# Patient Record
Sex: FEMALE | Race: WHITE | Employment: OTHER | URBAN - METROPOLITAN AREA
[De-identification: names, ages, dates, MRNs, and addresses within clinical notes are randomized per-mention and may not be internally consistent; named-entity substitution may affect disease eponyms.]

---

## 2021-11-30 PROBLEM — Z86.010 PERSONAL HISTORY OF COLONIC POLYPS: Status: ACTIVE | Noted: 2021-11-30

## 2021-11-30 PROBLEM — I10 HTN (HYPERTENSION): Status: ACTIVE | Noted: 2021-11-30

## 2021-11-30 PROBLEM — E07.9 THYROID DISORDER: Status: ACTIVE | Noted: 2021-11-30

## 2021-11-30 RX ORDER — TRAMADOL HYDROCHLORIDE 50 MG/1
50 TABLET ORAL
COMMUNITY
Start: 2021-09-29

## 2021-11-30 RX ORDER — ZOLPIDEM TARTRATE 10 MG/1
10 TABLET ORAL
COMMUNITY
Start: 2021-11-12

## 2021-11-30 RX ORDER — VERAPAMIL HYDROCHLORIDE 240 MG/1
240 TABLET, FILM COATED, EXTENDED RELEASE ORAL DAILY
COMMUNITY
Start: 2021-10-06

## 2021-11-30 RX ORDER — LEVOTHYROXINE SODIUM 75 UG/1
75 TABLET ORAL DAILY
COMMUNITY
Start: 2021-11-11

## 2021-12-01 ENCOUNTER — OFFICE VISIT (OUTPATIENT)
Dept: GASTROENTEROLOGY | Age: 81
End: 2021-12-01
Payer: MEDICARE

## 2021-12-01 VITALS
HEIGHT: 57 IN | DIASTOLIC BLOOD PRESSURE: 72 MMHG | OXYGEN SATURATION: 95 % | HEART RATE: 79 BPM | BODY MASS INDEX: 23.86 KG/M2 | TEMPERATURE: 97.7 F | RESPIRATION RATE: 18 BRPM | WEIGHT: 110.6 LBS | SYSTOLIC BLOOD PRESSURE: 116 MMHG

## 2021-12-01 DIAGNOSIS — K50.119 CROHN'S DISEASE OF LARGE INTESTINE WITH COMPLICATION (HCC): Primary | ICD-10-CM

## 2021-12-01 DIAGNOSIS — N18.4 STAGE 4 CHRONIC KIDNEY DISEASE (HCC): ICD-10-CM

## 2021-12-01 DIAGNOSIS — Z86.010 PERSONAL HISTORY OF COLONIC POLYPS: ICD-10-CM

## 2021-12-01 DIAGNOSIS — K21.00 GASTROESOPHAGEAL REFLUX DISEASE WITH ESOPHAGITIS WITHOUT HEMORRHAGE: ICD-10-CM

## 2021-12-01 PROCEDURE — G8427 DOCREV CUR MEDS BY ELIG CLIN: HCPCS | Performed by: INTERNAL MEDICINE

## 2021-12-01 PROCEDURE — 1090F PRES/ABSN URINE INCON ASSESS: CPT | Performed by: INTERNAL MEDICINE

## 2021-12-01 PROCEDURE — G8510 SCR DEP NEG, NO PLAN REQD: HCPCS | Performed by: INTERNAL MEDICINE

## 2021-12-01 PROCEDURE — 99204 OFFICE O/P NEW MOD 45 MIN: CPT | Performed by: INTERNAL MEDICINE

## 2021-12-01 PROCEDURE — 1101F PT FALLS ASSESS-DOCD LE1/YR: CPT | Performed by: INTERNAL MEDICINE

## 2021-12-01 PROCEDURE — G8536 NO DOC ELDER MAL SCRN: HCPCS | Performed by: INTERNAL MEDICINE

## 2021-12-01 PROCEDURE — G8400 PT W/DXA NO RESULTS DOC: HCPCS | Performed by: INTERNAL MEDICINE

## 2021-12-01 PROCEDURE — G8420 CALC BMI NORM PARAMETERS: HCPCS | Performed by: INTERNAL MEDICINE

## 2021-12-01 RX ORDER — DULOXETIN HYDROCHLORIDE 30 MG/1
1 CAPSULE, DELAYED RELEASE ORAL DAILY
COMMUNITY
Start: 2021-04-29

## 2021-12-01 RX ORDER — MINERAL OIL
1 ENEMA (ML) RECTAL DAILY
COMMUNITY

## 2021-12-01 RX ORDER — VEDOLIZUMAB 300 MG/5ML
300 INJECTION, POWDER, LYOPHILIZED, FOR SOLUTION INTRAVENOUS
COMMUNITY

## 2021-12-01 RX ORDER — FLUTICASONE PROPIONATE 50 MCG
2 SPRAY, SUSPENSION (ML) NASAL DAILY
COMMUNITY

## 2021-12-01 NOTE — PROGRESS NOTES
Chief Complaint   Patient presents with    New Order     new patient, hx of colon polyps, Crohns, previous Dr Ward Mac patient. 1. Have you been to the ER, urgent care clinic since your last visit? Hospitalized since your last visit? No    2. Have you seen or consulted any other health care providers outside of the [address] [place] [address] since your last visit? Include any pap smears or colon screening.  No   Visit Vitals  /72 (BP 1 Location: Right arm, BP Patient Position: Sitting, BP Cuff Size: Adult)   Pulse 79   Temp 97.7 °F (36.5 °C) (Temporal)   Resp 18   Ht 4' 9\" (1.448 m)   Wt 110 lb 9.6 oz (50.2 kg)   SpO2 95%   BMI 23.93 kg/m²

## 2021-12-01 NOTE — PROGRESS NOTES
Lawayne Felty is a [de-identified] y.o. female who presents today for the following:  Chief Complaint   Patient presents with    New Order     new patient, hx of colon polyps, Crohns, previous Dr Kurtis Stephens patient. No Known Allergies    Current Outpatient Medications   Medication Sig    DULoxetine (CYMBALTA) 30 mg capsule 1 Capsule daily.  fexofenadine (ALLEGRA) 180 mg tablet Take 1 Tablet by mouth daily.  levothyroxine (SYNTHROID) 75 mcg tablet     traMADoL (ULTRAM) 50 mg tablet     verapamil ER (CALAN-SR) 240 mg CR tablet     zolpidem (AMBIEN) 10 mg tablet     vedolizumab (Entyvio) 300 mg injection 300 mg by IntraVENous route every six (6) weeks.  fluticasone propionate (FLONASE) 50 mcg/actuation nasal spray 2 Sprays by Both Nostrils route daily.  calcium-cholecalciferol, d3, 600 mg calcium- 200 unit cap Take 1 Capsule by mouth daily. No current facility-administered medications for this visit. Past Medical History:   Diagnosis Date    Colon polyps 03/07/2019    rectal polyp    Constipation     Crohn disease ([address]     Early satiety     GERD (gastroesophageal reflux disease)     HTN (hypertension) 11/30/2021    Thyroid disorder 11/30/2021       Past Surgical History:   Procedure Laterality Date    COLONOSCOPY  03/07/2019    rectal polyp  Dr Aram Shelton VISIT-OUTPATIENT  03/07/2017    DR Joanna Leiva  - EARLY SATIETY, WT LOSS , BACK PAIN    HX APPENDECTOMY      HX HYSTERECTOMY         No family history on file.     Social History     Socioeconomic History    Marital status:      Spouse name: Not on file    Number of children: Not on file    Years of education: Not on file    Highest education level: Not on file   Occupational History    Not on file   Tobacco Use    Smoking status: Not on file    Smokeless tobacco: Not on file   Substance and Sexual Activity    Alcohol use: Not on file    Drug use: Not on file    Sexual activity: Not on file   Other Topics Concern    Not on file   Social History Narrative    Not on file     Social Determinants of Health     Financial Resource Strain:     Difficulty of Paying Living Expenses: Not on file   Food Insecurity:     Worried About Running Out of Food in the Last Year: Not on file    Ayush of Food in the Last Year: Not on file   Transportation Needs:     Lack of Transportation (Medical): Not on file    Lack of Transportation (Non-Medical): Not on file   Physical Activity:     Days of Exercise per Week: Not on file    Minutes of Exercise per Session: Not on file   Stress:     Feeling of Stress : Not on file   Social Connections:     Frequency of Communication with Friends and Family: Not on file    Frequency of Social Gatherings with Friends and Family: Not on file    Attends Scientology Services: Not on file    Active Member of [address] [place] [address] [employer] or Organizations: Not on file    Attends Club or Organization Meetings: Not on file    Marital Status: Not on file   Intimate Partner Violence:     Fear of Current or Ex-Partner: Not on file    Emotionally Abused: Not on file    Physically Abused: Not on file    Sexually Abused: Not on file   Housing Stability:     Unable to Pay for Housing in the Last Year: Not on file    Number of [place] in the Last Year: Not on file    Unstable Housing in the Last Year: Not on file         HPI  49-year-old female with history of hypertension, chronic kidney disease stage IV, Crohn's disease, and GERD who comes in for evaluation of Crohn's disease. The patient is a former patient of Dr. Rosa Henriquez who left the area. Wishes to find a new gastroenterologist to assist in the management of her Crohn's disease. Patient states she was diagnosed with Crohn's disease at age 58. She has been on numerous medications including Asacol, Remicade, Imuran, and presently on Entyvio. Patient states she is unsure whether the [facility] Fleming is actually helping.   She states her main complications of the Crohn's disease occurs in her rectum which she has had chills and abscess in the past.  She still has discomfort in the rectum. She has a lot of constipation. No recent rectal bleeding. She does have a history of hemorrhoids. She states prior to her diagnosis of Crohn's disease she weighed approximately 180 pounds and is down to her present weight of proximal 111 pounds but her weight has been stable over the last few years. Her appetite is poor. She takes omeprazole as needed for GERD. Patient states she has a lot of urgency on defecation. She also has chronic kidney disease stage IV which was diagnosed approximately 3 years ago and sees a nephrologist on a regular basis. She states that presently she is receiving her biologic therapy given by her PCP since her GI doctor left the area. Her PCP wants her to find a new GI doctor to manage her Crohn's disease. Review of Systems   Constitutional: Negative. HENT: Negative. Negative for nosebleeds. Eyes: Negative. Respiratory: Negative. Cardiovascular: Negative. Gastrointestinal: Positive for abdominal pain, constipation, diarrhea and heartburn. Negative for blood in stool, melena, nausea and vomiting. Genitourinary: Negative. Musculoskeletal: Positive for back pain and joint pain. Skin: Negative. Neurological: Negative. Endo/Heme/Allergies: Negative. Psychiatric/Behavioral: Negative. All other systems reviewed and are negative. Visit Vitals  /72 (BP 1 Location: Right arm, BP Patient Position: Sitting, BP Cuff Size: Adult)   Pulse 79   Temp 97.7 °F (36.5 °C) (Temporal)   Resp 18   Ht 4' 9\" (1.448 m)   Wt 50.2 kg (110 lb 9.6 oz)   SpO2 95%   BMI 23.93 kg/m²     Physical Exam  Vitals and nursing note reviewed. Constitutional:       General: She is not in acute distress. Appearance: Normal appearance. She is normal weight. She is not ill-appearing. HENT:      Head: Normocephalic and atraumatic.       Nose: Nose normal. Mouth/Throat:      Mouth: Mucous membranes are moist.      Pharynx: Oropharynx is clear. Eyes:      General: No scleral icterus. Conjunctiva/sclera: Conjunctivae normal.      Pupils: Pupils are equal, round, and reactive to light. Cardiovascular:      Rate and Rhythm: Normal rate and regular rhythm. Pulses: Normal pulses. Heart sounds: Normal heart sounds. Pulmonary:      Effort: Pulmonary effort is normal.      Breath sounds: Normal breath sounds. Abdominal:      General: Bowel sounds are normal. There is no distension. Palpations: Abdomen is soft. There is no mass. Tenderness: There is abdominal tenderness. There is no right CVA tenderness, left CVA tenderness, guarding or rebound. Hernia: No hernia is present. Musculoskeletal:         General: Normal range of motion. Cervical back: Normal range of motion and neck supple. Skin:     General: Skin is warm and dry. Coloration: Skin is not jaundiced. Neurological:      General: No focal deficit present. Mental Status: She is alert and oriented to person, place, and time. Psychiatric:         Mood and Affect: Mood normal.         Behavior: Behavior normal.         Thought Content: Thought content normal.         Judgment: Judgment normal.            1. Crohn's disease of large intestine with complication ([facility]  We will continue the [facility] for now. If at any point she wishes to stop use or no improvement is noted we will give her a trial of a 5-ASA which she states did help her in the past.  Will try to obtain information from her PCP about present therapy she is receiving. 2. Personal history of colonic polyps  We will plan on a repeat colonoscopy in 5 years from last which would be 2024    3. Stage 4 chronic kidney disease ([facility]  Followed by her nephrologist    4.  Gastroesophageal reflux disease with esophagitis without hemorrhage  Continue as needed use of omeprazole

## 2022-03-14 ENCOUNTER — OFFICE VISIT (OUTPATIENT)
Dept: GASTROENTEROLOGY | Age: 82
End: 2022-03-14
Payer: MEDICARE

## 2022-03-14 VITALS
SYSTOLIC BLOOD PRESSURE: 110 MMHG | WEIGHT: 109.4 LBS | HEIGHT: 57 IN | DIASTOLIC BLOOD PRESSURE: 60 MMHG | HEART RATE: 89 BPM | RESPIRATION RATE: 12 BRPM | TEMPERATURE: 98 F | BODY MASS INDEX: 23.6 KG/M2 | OXYGEN SATURATION: 96 %

## 2022-03-14 DIAGNOSIS — Z86.010 PERSONAL HISTORY OF COLONIC POLYPS: ICD-10-CM

## 2022-03-14 DIAGNOSIS — K50.119 CROHN'S DISEASE OF LARGE INTESTINE WITH COMPLICATION (HCC): Primary | ICD-10-CM

## 2022-03-14 DIAGNOSIS — R13.19 ESOPHAGEAL DYSPHAGIA: ICD-10-CM

## 2022-03-14 DIAGNOSIS — K21.9 GASTROESOPHAGEAL REFLUX DISEASE, UNSPECIFIED WHETHER ESOPHAGITIS PRESENT: ICD-10-CM

## 2022-03-14 DIAGNOSIS — N18.4 STAGE 4 CHRONIC KIDNEY DISEASE (HCC): ICD-10-CM

## 2022-03-14 PROCEDURE — G8427 DOCREV CUR MEDS BY ELIG CLIN: HCPCS | Performed by: INTERNAL MEDICINE

## 2022-03-14 PROCEDURE — 99214 OFFICE O/P EST MOD 30 MIN: CPT | Performed by: INTERNAL MEDICINE

## 2022-03-14 PROCEDURE — G8536 NO DOC ELDER MAL SCRN: HCPCS | Performed by: INTERNAL MEDICINE

## 2022-03-14 PROCEDURE — G8400 PT W/DXA NO RESULTS DOC: HCPCS | Performed by: INTERNAL MEDICINE

## 2022-03-14 PROCEDURE — G8510 SCR DEP NEG, NO PLAN REQD: HCPCS | Performed by: INTERNAL MEDICINE

## 2022-03-14 PROCEDURE — 1101F PT FALLS ASSESS-DOCD LE1/YR: CPT | Performed by: INTERNAL MEDICINE

## 2022-03-14 PROCEDURE — 1090F PRES/ABSN URINE INCON ASSESS: CPT | Performed by: INTERNAL MEDICINE

## 2022-03-14 PROCEDURE — G8420 CALC BMI NORM PARAMETERS: HCPCS | Performed by: INTERNAL MEDICINE

## 2022-03-14 PROCEDURE — G8754 DIAS BP LESS 90: HCPCS | Performed by: INTERNAL MEDICINE

## 2022-03-14 PROCEDURE — G8752 SYS BP LESS 140: HCPCS | Performed by: INTERNAL MEDICINE

## 2022-03-14 RX ORDER — POLYETHYLENE GLYCOL 3350 17 G/17G
POWDER, FOR SOLUTION ORAL
Qty: 510 G | Refills: 0 | Status: SHIPPED | OUTPATIENT
Start: 2022-03-14 | End: 2022-03-23

## 2022-03-14 NOTE — PROGRESS NOTES
EGD AND COLONOSCOPY ARE SCHEDULED FOR 3- AT 9:30 AM.  COVID TEST IS SCHEDULED FOR 3-. NO PA REQUIRED - MEDICARE.

## 2022-03-14 NOTE — PROGRESS NOTES
1. Have you been to the ER, urgent care clinic since your last visit? Hospitalized since your last visit? NO    2. Have you seen or consulted any other health care providers outside of the [address] [place] [address] since your last visit? Include any pap smears or colon screening.  NO   Chief Complaint   Patient presents with    Follow-up     2 month follow up    Inflammatory Bowel Disease     CROHN'S     Visit Vitals  /60 (BP 1 Location: Left upper arm, BP Patient Position: Sitting, BP Cuff Size: Adult)   Pulse 89   Temp 98 °F (36.7 °C) (Temporal)   Resp 12   Ht 4' 9\" (1.448 m)   Wt 49.6 kg (109 lb 6.4 oz)   SpO2 96% Comment: ROOM AIR   BMI 23.67 kg/m²

## 2022-03-15 NOTE — PROGRESS NOTES
Yesenia Cage is a 80 y.o. female who presents today for the following:  Chief Complaint   Patient presents with    Follow-up     2 month follow up    Inflammatory Bowel Disease     CROHN'S   ABOUT THE SAME,  NAUSEA DIARRHEA, CONSTIPATION         No Known Allergies    Current Outpatient Medications   Medication Sig    polyethylene glycol (MIRALAX) 17 gram/dose powder Use as directed  Indications: emptying of the bowel    DULoxetine (CYMBALTA) 30 mg capsule 1 Capsule daily.  vedolizumab (Entyvio) 300 mg injection 300 mg by IntraVENous route every six (6) weeks.  calcium-cholecalciferol, d3, 600 mg calcium- 200 unit cap Take 1 Capsule by mouth daily.  levothyroxine (SYNTHROID) 75 mcg tablet Take 75 mcg by mouth daily.  traMADoL (ULTRAM) 50 mg tablet 50 mg every eight (8) hours as needed.  verapamil ER (CALAN-SR) 240 mg CR tablet     zolpidem (AMBIEN) 10 mg tablet Take 10 mg by mouth nightly as needed.  fexofenadine (ALLEGRA) 180 mg tablet Take 1 Tablet by mouth daily.  fluticasone propionate (FLONASE) 50 mcg/actuation nasal spray 2 Sprays by Both Nostrils route daily. No current facility-administered medications for this visit.        Past Medical History:   Diagnosis Date    Colon polyps 03/07/2019    rectal polyp    Constipation     Crohn disease ([address]     Early satiety     GERD (gastroesophageal reflux disease)     HTN (hypertension) 11/30/2021    Thyroid disorder 11/30/2021       Past Surgical History:   Procedure Laterality Date    COLONOSCOPY  03/07/2019    rectal polyp  Dr Richard Bernal ENDOSCOPY VISIT-OUTPATIENT  03/07/2017    DR Carly Lowry  - EARLY SATIETY, WT LOSS , BACK PAIN    HX APPENDECTOMY      HX HYSTERECTOMY         Family History   Problem Relation Age of Onset    Pancreatic Cancer Mother     Diabetes Mother     Cancer Mother     Heart Disease Father     Hypertension Sister     Diabetes Sister     Multiple myeloma Sister     Glaucoma Sister        Social History Socioeconomic History    Marital status:      Spouse name: Not on file    Number of children: Not on file    Years of education: Not on file    Highest education level: Not on file   Occupational History    Not on file   Tobacco Use    Smoking status: Never Smoker    Smokeless tobacco: Never Used   Vaping Use    Vaping Use: Never used   Substance and Sexual Activity    Alcohol use: Never    Drug use: Never    Sexual activity: Not on file   Other Topics Concern    Not on file   Social History Narrative    Not on file     Social Determinants of Health     Financial Resource Strain:     Difficulty of Paying Living Expenses: Not on file   Food Insecurity:     Worried About [address] [employer] in the Last Year: Not on file    Ayuhs of Food in the Last Year: Not on file   Transportation Needs:     Lack of Transportation (Medical): Not on file    Lack of Transportation (Non-Medical):  Not on file   Physical Activity:     Days of Exercise per Week: Not on file    Minutes of Exercise per Session: Not on file   Stress:     Feeling of Stress : Not on file   Social Connections:     Frequency of Communication with Friends and Family: Not on file    Frequency of Social Gatherings with Friends and Family: Not on file    Attends Druze Services: Not on file    Active Member of [address] [place] [address] [employer] or Organizations: Not on file    Attends Club or Organization Meetings: Not on file    Marital Status: Not on file   Intimate Partner Violence:     Fear of Current or Ex-Partner: Not on file    Emotionally Abused: Not on file    Physically Abused: Not on file    Sexually Abused: Not on file   Housing Stability:     Unable to Pay for Housing in the Last Year: Not on file    Number of [place] in the Last Year: Not on file    Unstable Housing in the Last Year: Not on file         HPI  80year-old with history of hypertension, chronic kidney disease stage IV, Crohn's disease on a biologic agent, and gastroesophageal reflux disease who comes in for follow-up visit for Crohn's disease. Patient states she has good and bad days. She states she has constipation she also has incontinence on laying down at night. She states her main problem is in her rectum which is the location of her Crohn's disease primarily. She has burning in the lower abdomen in the mornings which improves as the day goes on. Her appetite is only fair. She states her taste is not as good. She has been maintaining her weight. She states that her treatment with Entyvio causes ulcers in her mouth and soreness in that area for the week after completion of her treatment she receives treatment every 6 to 8 weeks. Her next treatment is due in April 2022. He states that her chronic kidney disease stage IV has been stable however when she found that she had kidney disease it was already at stage IV. Patient last had a colonoscopy in 2017 by Dr. Darleen Siemens and it was at this time that she was started on the [facility] afterwards. She had been on Remicade prior to that time and it was changed secondary to no improvement which was noted with that medication. Patient has been having problems with her swallowing. It was last examined in 2019 at the time of her colonoscopy. Review of Systems   Constitutional: Negative. HENT: Negative for nosebleeds. Eyes: Negative. Respiratory: Negative. Cardiovascular: Negative. Gastrointestinal: Positive for abdominal pain, constipation and diarrhea. Negative for blood in stool, heartburn, melena, nausea and vomiting. Genitourinary: Negative. Musculoskeletal: Positive for back pain and joint pain. Skin: Negative. Neurological: Negative. Endo/Heme/Allergies: Negative. Psychiatric/Behavioral: Negative. All other systems reviewed and are negative.         Visit Vitals  /60 (BP 1 Location: Left upper arm, BP Patient Position: Sitting, BP Cuff Size: Adult)   Pulse 89   Temp 98 °F (36.7 °C) (Temporal)   Resp 12   Ht 4' 9\" (1.448 m)   Wt 49.6 kg (109 lb 6.4 oz)   SpO2 96% Comment: ROOM AIR   BMI 23.67 kg/m²     Physical Exam  Vitals and nursing note reviewed. Constitutional:       Appearance: Normal appearance. She is normal weight. HENT:      Head: Normocephalic and atraumatic. Nose: Nose normal.      Mouth/Throat:      Mouth: Mucous membranes are moist.      Pharynx: Oropharynx is clear. Eyes:      General: No scleral icterus. Conjunctiva/sclera: Conjunctivae normal.      Pupils: Pupils are equal, round, and reactive to light. Cardiovascular:      Rate and Rhythm: Normal rate and regular rhythm. Pulses: Normal pulses. Heart sounds: Normal heart sounds. Pulmonary:      Effort: Pulmonary effort is normal.      Breath sounds: Normal breath sounds. Abdominal:      General: Bowel sounds are normal. There is no distension. Palpations: Abdomen is soft. There is no mass. Tenderness: There is no abdominal tenderness. There is no right CVA tenderness, left CVA tenderness, guarding or rebound. Hernia: No hernia is present. Musculoskeletal:         General: Normal range of motion. Cervical back: Normal range of motion and neck supple. Skin:     General: Skin is warm and dry. Coloration: Skin is not jaundiced. Neurological:      General: No focal deficit present. Mental Status: She is alert and oriented to person, place, and time. Psychiatric:         Mood and Affect: Mood normal.         Behavior: Behavior normal.         Thought Content:  Thought content normal.         Judgment: Judgment normal.            1. Crohn's disease of large intestine with complication ([address]  We will need to reevaluate the effectiveness of her biologic agent since her colon has not been reexamined since her last colonoscopy in 2019.  - COLONOSCOPY,DIAGNOSTIC; Future  - polyethylene glycol (MIRALAX) 17 gram/dose powder; Use as directed  Indications: emptying of the bowel  Dispense: 510 g; Refill: 0    2. Personal history of colonic polyps    - COLONOSCOPY,DIAGNOSTIC; Future  - polyethylene glycol (MIRALAX) 17 gram/dose powder; Use as directed  Indications: emptying of the bowel  Dispense: 510 g; Refill: 0    3. Esophageal dysphagia  We will schedule patient for an EGD to evaluate the dysphagia which patient is having with plans for possible form esophageal dilatation  - UPPER GI ENDOSCOPY,DIAGNOSIS; Future    4. Gastroesophageal reflux disease, unspecified whether esophagitis present      5.  Stage 4 chronic kidney disease ([facility]

## 2022-03-15 NOTE — H&P (VIEW-ONLY)
Yesenia Cage is a 80 y.o. female who presents today for the following:  Chief Complaint   Patient presents with    Follow-up     2 month follow up    Inflammatory Bowel Disease     CROHN'S   ABOUT THE SAME,  NAUSEA DIARRHEA, CONSTIPATION         No Known Allergies    Current Outpatient Medications   Medication Sig    polyethylene glycol (MIRALAX) 17 gram/dose powder Use as directed  Indications: emptying of the bowel    DULoxetine (CYMBALTA) 30 mg capsule 1 Capsule daily.  vedolizumab (Entyvio) 300 mg injection 300 mg by IntraVENous route every six (6) weeks.  calcium-cholecalciferol, d3, 600 mg calcium- 200 unit cap Take 1 Capsule by mouth daily.  levothyroxine (SYNTHROID) 75 mcg tablet Take 75 mcg by mouth daily.  traMADoL (ULTRAM) 50 mg tablet 50 mg every eight (8) hours as needed.  verapamil ER (CALAN-SR) 240 mg CR tablet     zolpidem (AMBIEN) 10 mg tablet Take 10 mg by mouth nightly as needed.  fexofenadine (ALLEGRA) 180 mg tablet Take 1 Tablet by mouth daily.  fluticasone propionate (FLONASE) 50 mcg/actuation nasal spray 2 Sprays by Both Nostrils route daily. No current facility-administered medications for this visit.        Past Medical History:   Diagnosis Date    Colon polyps 03/07/2019    rectal polyp    Constipation     Crohn disease ([address]     Early satiety     GERD (gastroesophageal reflux disease)     HTN (hypertension) 11/30/2021    Thyroid disorder 11/30/2021       Past Surgical History:   Procedure Laterality Date    COLONOSCOPY  03/07/2019    rectal polyp  Dr Richard Bernal ENDOSCOPY VISIT-OUTPATIENT  03/07/2017    DR Carly Lowry  - EARLY SATIETY, WT LOSS , BACK PAIN    HX APPENDECTOMY      HX HYSTERECTOMY         Family History   Problem Relation Age of Onset    Pancreatic Cancer Mother     Diabetes Mother     Cancer Mother     Heart Disease Father     Hypertension Sister     Diabetes Sister     Multiple myeloma Sister     Glaucoma Sister        Social History Socioeconomic History    Marital status:      Spouse name: Not on file    Number of children: Not on file    Years of education: Not on file    Highest education level: Not on file   Occupational History    Not on file   Tobacco Use    Smoking status: Never Smoker    Smokeless tobacco: Never Used   Vaping Use    Vaping Use: Never used   Substance and Sexual Activity    Alcohol use: Never    Drug use: Never    Sexual activity: Not on file   Other Topics Concern    Not on file   Social History Narrative    Not on file     Social Determinants of Health     Financial Resource Strain:     Difficulty of Paying Living Expenses: Not on file   Food Insecurity:     Worried About [employer] in the Last Year: Not on file    Ayush of Food in the Last Year: Not on file   Transportation Needs:     Lack of Transportation (Medical): Not on file    Lack of Transportation (Non-Medical):  Not on file   Physical Activity:     Days of Exercise per Week: Not on file    Minutes of Exercise per Session: Not on file   Stress:     Feeling of Stress : Not on file   Social Connections:     Frequency of Communication with Friends and Family: Not on file    Frequency of Social Gatherings with Friends and Family: Not on file    Attends [employer] Services: Not on file    Active Member of [employer] or Organizations: Not on file    Attends Club or Organization Meetings: Not on file    Marital Status: Not on file   Intimate Partner Violence:     Fear of Current or Ex-Partner: Not on file    Emotionally Abused: Not on file    Physically Abused: Not on file    Sexually Abused: Not on file   Housing Stability:     Unable to Pay for Housing in the Last Year: Not on file    Number of [place] in the Last Year: Not on file    Unstable Housing in the Last Year: Not on file         HPI  80year-old with history of hypertension, chronic kidney disease stage IV, Crohn's disease on a biologic agent, and gastroesophageal reflux disease who comes in for follow-up visit for Crohn's disease. Patient states she has good and bad days. She states she has constipation she also has incontinence on laying down at night. She states her main problem is in her rectum which is the location of her Crohn's disease primarily. She has burning in the lower abdomen in the mornings which improves as the day goes on. Her appetite is only fair. She states her taste is not as good. She has been maintaining her weight. She states that her treatment with Entyvio causes ulcers in her mouth and soreness in that area for the week after completion of her treatment she receives treatment every 6 to 8 weeks. Her next treatment is due in April 2022. He states that her chronic kidney disease stage IV has been stable however when she found that she had kidney disease it was already at stage IV. Patient last had a colonoscopy in 2017 by Dr. Leyva and it was at this time that she was started on the [facility] afterwards. She had been on Remicade prior to that time and it was changed secondary to no improvement which was noted with that medication. Patient has been having problems with her swallowing. It was last examined in 2019 at the time of her colonoscopy. Review of Systems   Constitutional: Negative. HENT: Negative for nosebleeds. Eyes: Negative. Respiratory: Negative. Cardiovascular: Negative. Gastrointestinal: Positive for abdominal pain, constipation and diarrhea. Negative for blood in stool, heartburn, melena, nausea and vomiting. Genitourinary: Negative. Musculoskeletal: Positive for back pain and joint pain. Skin: Negative. Neurological: Negative. Endo/Heme/Allergies: Negative. Psychiatric/Behavioral: Negative. All other systems reviewed and are negative.         Visit Vitals  /60 (BP 1 Location: Left upper arm, BP Patient Position: Sitting, BP Cuff Size: Adult)   Pulse 89   Temp 98 °F (36.7 °C) (Temporal)   Resp 12   Ht 4' 9\" (1.448 m)   Wt 49.6 kg (109 lb 6.4 oz)   SpO2 96% Comment: ROOM AIR   BMI 23.67 kg/m²     Physical Exam  Vitals and nursing note reviewed. Constitutional:       Appearance: Normal appearance. She is normal weight. HENT:      Head: Normocephalic and atraumatic. Nose: Nose normal.      Mouth/Throat:      Mouth: Mucous membranes are moist.      Pharynx: Oropharynx is clear. Eyes:      General: No scleral icterus. Conjunctiva/sclera: Conjunctivae normal.      Pupils: Pupils are equal, round, and reactive to light. Cardiovascular:      Rate and Rhythm: Normal rate and regular rhythm. Pulses: Normal pulses. Heart sounds: Normal heart sounds. Pulmonary:      Effort: Pulmonary effort is normal.      Breath sounds: Normal breath sounds. Abdominal:      General: Bowel sounds are normal. There is no distension. Palpations: Abdomen is soft. There is no mass. Tenderness: There is no abdominal tenderness. There is no right CVA tenderness, left CVA tenderness, guarding or rebound. Hernia: No hernia is present. Musculoskeletal:         General: Normal range of motion. Cervical back: Normal range of motion and neck supple. Skin:     General: Skin is warm and dry. Coloration: Skin is not jaundiced. Neurological:      General: No focal deficit present. Mental Status: She is alert and oriented to person, place, and time. Psychiatric:         Mood and Affect: Mood normal.         Behavior: Behavior normal.         Thought Content:  Thought content normal.         Judgment: Judgment normal.            1. Crohn's disease of large intestine with complication ([address]  We will need to reevaluate the effectiveness of her biologic agent since her colon has not been reexamined since her last colonoscopy in 2019.  - COLONOSCOPY,DIAGNOSTIC; Future  - polyethylene glycol (MIRALAX) 17 gram/dose powder; Use as directed  Indications: emptying of the bowel  Dispense: 510 g; Refill: 0    2. Personal history of colonic polyps    - COLONOSCOPY,DIAGNOSTIC; Future  - polyethylene glycol (MIRALAX) 17 gram/dose powder; Use as directed  Indications: emptying of the bowel  Dispense: 510 g; Refill: 0    3. Esophageal dysphagia  We will schedule patient for an EGD to evaluate the dysphagia which patient is having with plans for possible form esophageal dilatation  - UPPER GI ENDOSCOPY,DIAGNOSIS; Future    4. Gastroesophageal reflux disease, unspecified whether esophagitis present      5.  Stage 4 chronic kidney disease ([facility] [address] [facility]

## 2022-03-18 ENCOUNTER — HOSPITAL ENCOUNTER (OUTPATIENT)
Dept: PREADMISSION TESTING | Age: 82
Discharge: HOME | End: 2022-03-18
Payer: MEDICARE

## 2022-03-18 LAB
FLUAV RNA SPEC QL NAA+PROBE: NOT DETECTED
FLUBV RNA SPEC QL NAA+PROBE: NOT DETECTED
SARS-COV-2, COV2: NOT DETECTED

## 2022-03-18 PROCEDURE — 87636 SARSCOV2 & INF A&B AMP PRB: CPT

## 2022-03-19 PROBLEM — E07.9 THYROID DISORDER: Status: ACTIVE | Noted: 2021-11-30

## 2022-03-19 PROBLEM — I10 HTN (HYPERTENSION): Status: ACTIVE | Noted: 2021-11-30

## 2022-03-19 PROBLEM — N18.4 STAGE 4 CHRONIC KIDNEY DISEASE (HCC): Status: ACTIVE | Noted: 2021-12-01

## 2022-03-19 PROBLEM — Z86.010 PERSONAL HISTORY OF COLONIC POLYPS: Status: ACTIVE | Noted: 2021-11-30

## 2022-03-23 ENCOUNTER — ANESTHESIA EVENT (OUTPATIENT)
Dept: ENDOSCOPY | Age: 82
End: 2022-03-23
Payer: MEDICARE

## 2022-03-23 ENCOUNTER — HOSPITAL ENCOUNTER (OUTPATIENT)
Age: 82
Setting detail: OUTPATIENT SURGERY
Discharge: HOME | End: 2022-03-23
Attending: INTERNAL MEDICINE | Admitting: INTERNAL MEDICINE
Payer: MEDICARE

## 2022-03-23 ENCOUNTER — ANESTHESIA (OUTPATIENT)
Dept: ENDOSCOPY | Age: 82
End: 2022-03-23
Payer: MEDICARE

## 2022-03-23 VITALS
HEIGHT: 56 IN | RESPIRATION RATE: 18 BRPM | BODY MASS INDEX: 24.21 KG/M2 | HEART RATE: 81 BPM | SYSTOLIC BLOOD PRESSURE: 144 MMHG | WEIGHT: 107.6 LBS | TEMPERATURE: 98.3 F | OXYGEN SATURATION: 96 % | DIASTOLIC BLOOD PRESSURE: 67 MMHG

## 2022-03-23 DIAGNOSIS — K21.00 GASTROESOPHAGEAL REFLUX DISEASE WITH ESOPHAGITIS WITHOUT HEMORRHAGE: Primary | ICD-10-CM

## 2022-03-23 PROCEDURE — 74011000250 HC RX REV CODE- 250

## 2022-03-23 PROCEDURE — 77030021593 HC FCPS BIOP ENDOSC BSC -A: Performed by: INTERNAL MEDICINE

## 2022-03-23 PROCEDURE — 2709999900 HC NON-CHARGEABLE SUPPLY: Performed by: INTERNAL MEDICINE

## 2022-03-23 PROCEDURE — 43239 EGD BIOPSY SINGLE/MULTIPLE: CPT | Performed by: INTERNAL MEDICINE

## 2022-03-23 PROCEDURE — 76060000032 HC ANESTHESIA 0.5 TO 1 HR: Performed by: INTERNAL MEDICINE

## 2022-03-23 PROCEDURE — 76040000007: Performed by: INTERNAL MEDICINE

## 2022-03-23 PROCEDURE — 88305 TISSUE EXAM BY PATHOLOGIST: CPT

## 2022-03-23 PROCEDURE — 74011250636 HC RX REV CODE- 250/636

## 2022-03-23 PROCEDURE — 74011250636 HC RX REV CODE- 250/636: Performed by: NURSE ANESTHETIST, CERTIFIED REGISTERED

## 2022-03-23 PROCEDURE — 74011250636 HC RX REV CODE- 250/636: Performed by: INTERNAL MEDICINE

## 2022-03-23 PROCEDURE — 74011000250 HC RX REV CODE- 250: Performed by: NURSE ANESTHETIST, CERTIFIED REGISTERED

## 2022-03-23 PROCEDURE — 88312 SPECIAL STAINS GROUP 1: CPT

## 2022-03-23 RX ORDER — SODIUM CHLORIDE 9 MG/ML
25 INJECTION, SOLUTION INTRAVENOUS ONCE
Status: COMPLETED | OUTPATIENT
Start: 2022-03-23 | End: 2022-03-23

## 2022-03-23 RX ORDER — GLYCOPYRROLATE 0.2 MG/ML
INJECTION INTRAMUSCULAR; INTRAVENOUS AS NEEDED
Status: DISCONTINUED | OUTPATIENT
Start: 2022-03-23 | End: 2022-03-23 | Stop reason: HOSPADM

## 2022-03-23 RX ORDER — SODIUM CHLORIDE 0.9 % (FLUSH) 0.9 %
5-40 SYRINGE (ML) INJECTION EVERY 8 HOURS
Status: DISCONTINUED | OUTPATIENT
Start: 2022-03-23 | End: 2022-03-23 | Stop reason: HOSPADM

## 2022-03-23 RX ORDER — KETAMINE HYDROCHLORIDE 10 MG/ML
INJECTION, SOLUTION INTRAMUSCULAR; INTRAVENOUS AS NEEDED
Status: DISCONTINUED | OUTPATIENT
Start: 2022-03-23 | End: 2022-03-23 | Stop reason: HOSPADM

## 2022-03-23 RX ORDER — SODIUM CHLORIDE 9 MG/ML
125 INJECTION, SOLUTION INTRAVENOUS CONTINUOUS
Status: DISCONTINUED | OUTPATIENT
Start: 2022-03-23 | End: 2022-03-23 | Stop reason: HOSPADM

## 2022-03-23 RX ORDER — SODIUM CHLORIDE 0.9 % (FLUSH) 0.9 %
5-40 SYRINGE (ML) INJECTION AS NEEDED
Status: DISCONTINUED | OUTPATIENT
Start: 2022-03-23 | End: 2022-03-23 | Stop reason: HOSPADM

## 2022-03-23 RX ORDER — PROPOFOL 10 MG/ML
INJECTION, EMULSION INTRAVENOUS AS NEEDED
Status: DISCONTINUED | OUTPATIENT
Start: 2022-03-23 | End: 2022-03-23 | Stop reason: HOSPADM

## 2022-03-23 RX ORDER — FAMOTIDINE 40 MG/1
40 TABLET, FILM COATED ORAL DAILY
Qty: 30 TABLET | Refills: 3 | Status: SHIPPED | OUTPATIENT
Start: 2022-03-23 | End: 2022-06-27

## 2022-03-23 RX ORDER — SODIUM CHLORIDE 9 MG/ML
INJECTION, SOLUTION INTRAVENOUS
Status: DISCONTINUED | OUTPATIENT
Start: 2022-03-23 | End: 2022-03-23 | Stop reason: HOSPADM

## 2022-03-23 RX ADMIN — SODIUM CHLORIDE 25 ML/HR: 9 INJECTION, SOLUTION INTRAVENOUS at 09:00

## 2022-03-23 RX ADMIN — PROPOFOL 10 MG: 10 INJECTION, EMULSION INTRAVENOUS at 11:13

## 2022-03-23 RX ADMIN — PROPOFOL 10 MG: 10 INJECTION, EMULSION INTRAVENOUS at 11:26

## 2022-03-23 RX ADMIN — GLYCOPYRROLATE 0.2 MG: 0.2 INJECTION, SOLUTION INTRAMUSCULAR; INTRAVENOUS at 10:48

## 2022-03-23 RX ADMIN — PROPOFOL 30 MG: 10 INJECTION, EMULSION INTRAVENOUS at 10:56

## 2022-03-23 RX ADMIN — PROPOFOL 20 MG: 10 INJECTION, EMULSION INTRAVENOUS at 11:08

## 2022-03-23 RX ADMIN — PROPOFOL 20 MG: 10 INJECTION, EMULSION INTRAVENOUS at 11:02

## 2022-03-23 RX ADMIN — SODIUM CHLORIDE: 9 INJECTION, SOLUTION INTRAVENOUS at 10:48

## 2022-03-23 RX ADMIN — PROPOFOL 10 MG: 10 INJECTION, EMULSION INTRAVENOUS at 11:18

## 2022-03-23 RX ADMIN — TOPICAL ANESTHETIC 1 SPRAY: 200 SPRAY DENTAL; PERIODONTAL at 10:48

## 2022-03-23 RX ADMIN — Medication 10 MG: at 11:13

## 2022-03-23 RX ADMIN — Medication 20 MG: at 11:01

## 2022-03-23 NOTE — INTERVAL H&P NOTE
Update History & Physical    The Patient's History and Physical of March 23, 2022,  was reviewed with the patient and I examined the patient. There was no change. The surgical site was confirmed by the patient and me. Plan:  The risk, benefits, expected outcome, and alternative to the recommended procedure have been discussed with the patient. Patient understands and wants to proceed with the procedure.     Electronically signed by Sheba Lacey MD on 3/23/2022 at 9:59 AM

## 2022-03-23 NOTE — ANESTHESIA POSTPROCEDURE EVALUATION
Procedure(s):  COLONOSCOPY/EGD (TIVA)  ESOPHAGOGASTRODUODENOSCOPY (EGD). MAC    Anesthesia Post Evaluation      Multimodal analgesia: multimodal analgesia used between 6 hours prior to anesthesia start to PACU discharge  Patient location during evaluation: PACU  Patient participation: complete - patient participated  Pain score: 0  Pain management: adequate  Airway patency: patent  Anesthetic complications: no  Cardiovascular status: acceptable and stable  Respiratory status: acceptable and room air  Hydration status: acceptable  Post anesthesia nausea and vomiting:  none  Final Post Anesthesia Temperature Assessment:  Normothermia (36.0-37.5 degrees C)      INITIAL Post-op Vital signs: No vitals data found for the desired time range.

## 2022-03-23 NOTE — ANESTHESIA PREPROCEDURE EVALUATION
Relevant Problems   No relevant active problems       Anesthetic History   No history of anesthetic complications  Other anesthesia complications          Review of Systems / Medical History  Patient summary reviewed, nursing notes reviewed and pertinent labs reviewed    Pulmonary  Within defined limits                 Neuro/Psych   Within defined limits           Cardiovascular    Hypertension                   GI/Hepatic/Renal     GERD           Endo/Other      Hypothyroidism  Arthritis     Other Findings              Physical Exam    Airway  Mallampati: II    Neck ROM: normal range of motion        Cardiovascular    Rhythm: regular  Rate: normal         Dental    Dentition: Poor dentition     Pulmonary  Breath sounds clear to auscultation               Abdominal  Abdominal exam normal       Other Findings            Anesthetic Plan    ASA: 3  Anesthesia type: MAC            Anesthetic plan and risks discussed with: Patient

## 2022-03-23 NOTE — DISCHARGE INSTRUCTIONS

## 2022-03-23 NOTE — OP NOTES
EGD Procedure Note        Patient: David Wang MRN: [id number]  SSN: [id number]    YOB: 1940  Age: 80 y.o. Sex: female        Date/Time:  3/23/2022 11:55 AM         IMPRESSION:       1. Generalized gastritis with erosions  2. Atrophic gastric mucosa  3. Distal esophagitis (grade 2)  4. Dilated esophagus  5. Decrease esophageal tone/motility       RECOMMENDATIONS:    1. Check biopsy results. 2. Start patient on famotidine 40 mg daily. Procedure: Esophagogastroduodenoscopy with cold biopsy    Indication: Dysphagia    Endoscopist:  Cierra Rivers MD    Referring Provider:   Adriel Velez MD    History: The history and physical exam were reviewed and updated. Endoscope: GIF H190 Olympus video endoscope    Extent of Exam: Second part of the duodenum    ASA: Grade 2    Anethesia/Sedation:  TIVA    Description of the procedure: The procedure was discussed with the patient including risks, benefits, alternatives including risks of iv sedation, bleeding, perforation and aspiration. A safety timeout was performed. The patient was placed in the left lateral decubitus position. A bite block was placed. The patient was using standard protocol. The patients vital signs were monitored at all times including heart rate/rhythm, blood pressure and oxygen saturation. The endoscope was then passed under direct visualization to the second part of the duodenum. The endoscope was then slowly withdrawn while visualizing the mucosa. In the stomach a retroflexion was performed and gastric fundus and cardia visualized. The patient was then transferred to recovery in stable condition. Findings:   Esophagus: The esophageal mucosa was inflamed with scarring in the distal esophagus. The esophagus also appeared be somewhat dilated and quite tortuous with loss of normal tone. .  Stomach:  The gastric mucosa was inflamed throughout the entire stomach with numerous erosions which were noted throughout the stomach. Biopsies were taken in the gastric antrum of this finding. The mucosa was also noted to be atrophic. .   Duodenum: The duodenum mucosa was normal with no ulceration, mass, stricture and no evidence of villous atrophy. Therapies: None    Specimens:   ID Type Source Tests Collected by Time Destination   1 : gastric antrum Preservative   Natividad Adams MD 3/23/2022 1059 Pathology              EBL: Minimal    Complications:   None; patient tolerated the procedure well.      Implants: None    Discharge disposition:  Out of the recovery area when discharge criteria met         Iline Ormond, MD  March 23, 2022  11:55 AM

## 2022-03-23 NOTE — OP NOTES
Colonoscopy Procedure Note      Patient: Tea Nicholson MRN: [id number]  SSN: [id number]    YOB: 1940  Age: 80 y.o. Sex: female      Date of Procedure: 3/23/2022  Date/Time:  3/23/2022 11:40 AM       IMPRESSION:     1. Anal stenosis       RECOMMENDATIONS:     1) Will have patient seen by Dr. Brittany Mota for possible dilatation of the                   anus. INDICATION: Crohn's disease     PROCEDURE PERFORMED: Colonoscopy (aborted)     DESCRIPTION OF PROCEDURE: An informed consent was obtained. The patient was placed in left lateral position. Perianal inspection and a digital rectal exam was performed. Video colonoscope was introduced, but would not go through the anal canal..  Vital signs, pulse oximetry, single lead cardiac monitor were monitored throughout the procedure as the sedation was titrated to the desired effect ensuring patient comfort and safety. The patient tolerated the procedure very well and was transferred to the recovery area. Following is the summary of findings: On the initial rectal l exam he noted a large amount of drainage in the anal canal which prevented full insertion of the finger. Attempts were made to dilate the anus with use of the examining finger however unsuccessfully. Dilators were not available for use at the time of examination. The procedure was therefore aborted. ENDOSCOPIST: Evangelista English MD      ENDOSCOPE: Olympus videocolonoscope     ASSISTANT:Circ-1: Lauren Youngblood RN              Scrub Tech-1: Modesta Amaya     ANESTHESIA: TIVA      QUALITY OF PREPARATION: Unknown      FINDINGS:   1.  Anal stenosis       Complications: None     EBL: None     SPECIMENS:   ID Type Source Tests Collected by Time Destination   1 : gastric antrum Preservative   Catalino Baca MD 3/23/2022 1059 Pathology             Evangelista English MD  March 23, 2022  11:40 AM

## 2022-04-03 DIAGNOSIS — K62.4 ANAL STENOSIS: Primary | ICD-10-CM

## 2022-04-03 NOTE — PROGRESS NOTES
Patient that the biopsies taken in her stomach showed gastritis/inflammation. No infection was noted. She should continue the famotidine 40 mg daily. Will refer patient to Dr. Scotty Lee concerning the anal stenosis.

## 2022-04-12 ENCOUNTER — TELEPHONE (OUTPATIENT)
Dept: GASTROENTEROLOGY | Age: 82
End: 2022-04-12

## 2022-04-12 NOTE — TELEPHONE ENCOUNTER
Phone numbers listed for patient not working, Letter mailed. Patient nottified that the biopsies taken in her stomach showed gastritis/inflammation.  No infection was noted. Markendar Ugalde should continue the famotidine 40 mg daily.  Will refer patient to Dr. Mere Bear concerning the anal stenosis.

## 2022-05-05 ENCOUNTER — OFFICE VISIT (OUTPATIENT)
Dept: SURGERY | Age: 82
End: 2022-05-05
Payer: MEDICARE

## 2022-05-05 VITALS
RESPIRATION RATE: 18 BRPM | TEMPERATURE: 97.8 F | OXYGEN SATURATION: 94 % | BODY MASS INDEX: 24.84 KG/M2 | DIASTOLIC BLOOD PRESSURE: 64 MMHG | HEIGHT: 56 IN | HEART RATE: 69 BPM | SYSTOLIC BLOOD PRESSURE: 126 MMHG | WEIGHT: 110.4 LBS

## 2022-05-05 DIAGNOSIS — K50.119 CROHN'S DISEASE OF LARGE INTESTINE WITH COMPLICATION (HCC): Primary | ICD-10-CM

## 2022-05-05 DIAGNOSIS — Z86.010 PERSONAL HISTORY OF COLONIC POLYPS: ICD-10-CM

## 2022-05-05 PROCEDURE — G8510 SCR DEP NEG, NO PLAN REQD: HCPCS | Performed by: COLON & RECTAL SURGERY

## 2022-05-05 PROCEDURE — 99204 OFFICE O/P NEW MOD 45 MIN: CPT | Performed by: COLON & RECTAL SURGERY

## 2022-05-05 PROCEDURE — G8427 DOCREV CUR MEDS BY ELIG CLIN: HCPCS | Performed by: COLON & RECTAL SURGERY

## 2022-05-05 PROCEDURE — 1101F PT FALLS ASSESS-DOCD LE1/YR: CPT | Performed by: COLON & RECTAL SURGERY

## 2022-05-05 PROCEDURE — G8536 NO DOC ELDER MAL SCRN: HCPCS | Performed by: COLON & RECTAL SURGERY

## 2022-05-05 PROCEDURE — 1090F PRES/ABSN URINE INCON ASSESS: CPT | Performed by: COLON & RECTAL SURGERY

## 2022-05-05 PROCEDURE — G8752 SYS BP LESS 140: HCPCS | Performed by: COLON & RECTAL SURGERY

## 2022-05-05 PROCEDURE — 1123F ACP DISCUSS/DSCN MKR DOCD: CPT | Performed by: COLON & RECTAL SURGERY

## 2022-05-05 PROCEDURE — G8400 PT W/DXA NO RESULTS DOC: HCPCS | Performed by: COLON & RECTAL SURGERY

## 2022-05-05 PROCEDURE — G8754 DIAS BP LESS 90: HCPCS | Performed by: COLON & RECTAL SURGERY

## 2022-05-05 PROCEDURE — G8420 CALC BMI NORM PARAMETERS: HCPCS | Performed by: COLON & RECTAL SURGERY

## 2022-05-05 NOTE — PROGRESS NOTES
Chief Complaint   Patient presents with   [address] [place] [address] Patient     Ref by       Visit Vitals  /64 (BP 1 Location: Left arm, BP Patient Position: Sitting)   Pulse 69   Temp 97.8 °F (36.6 °C) (Temporal)   Resp 18   Ht 4' 8\" (1.422 m)   Wt 110 lb 6.4 oz (50.1 kg)   SpO2 94%   BMI 24.75 kg/m²

## 2022-05-05 NOTE — LETTER
6/2/2022    Patient: Isai Pleitez   YOB: 1940   Date of Visit: 5/5/2022     Jefferson Sanders MD  [address] [facility] [address]  Via Fax: [phone]     Alisa Vizcarra MD  [facility] [id number]  [address] [place]    Dear MD Alisa Fajardo MD,      Thank you for referring Ms. Isai Pleitez to [address] [place] [address] for evaluation. My notes for this consultation are attached. If you have questions, please do not hesitate to call me. I look forward to following your patient along with you.       Sincerely,    Tigist Fitzgerald MD

## 2022-05-09 ENCOUNTER — TELEPHONE (OUTPATIENT)
Dept: GASTROENTEROLOGY | Age: 82
End: 2022-05-09

## 2022-05-09 DIAGNOSIS — Z86.010 HX OF COLONIC POLYPS: ICD-10-CM

## 2022-05-09 DIAGNOSIS — K50.119 CROHN'S DISEASE OF LARGE INTESTINE WITH COMPLICATION (HCC): Primary | ICD-10-CM

## 2022-05-09 NOTE — TELEPHONE ENCOUNTER
COLONOSCOPY IS RESCHEDULED TO 6-2-2022 AT 12:00. DR Elizabeth Cespedes WILL ASSIST DR Cahrisse Becker. PATIENT WANTS SUPREP KIT. DALE REYES'S NURSE SAID PATIENT SAID SHE WILL PAY FOR IT. Suprep instructions mailed to patient.

## 2022-05-10 RX ORDER — SODIUM, POTASSIUM,MAG SULFATES 17.5-3.13G
SOLUTION, RECONSTITUTED, ORAL ORAL
Qty: 1 KIT | Refills: 0 | Status: SHIPPED | OUTPATIENT
Start: 2022-05-10 | End: 2022-06-02

## 2022-06-02 ENCOUNTER — HOSPITAL ENCOUNTER (OUTPATIENT)
Age: 82
Setting detail: OUTPATIENT SURGERY
Discharge: HOME | End: 2022-06-02
Attending: INTERNAL MEDICINE | Admitting: INTERNAL MEDICINE
Payer: MEDICARE

## 2022-06-02 ENCOUNTER — ANESTHESIA (OUTPATIENT)
Dept: ENDOSCOPY | Age: 82
End: 2022-06-02
Payer: MEDICARE

## 2022-06-02 ENCOUNTER — ANESTHESIA EVENT (OUTPATIENT)
Dept: ENDOSCOPY | Age: 82
End: 2022-06-02
Payer: MEDICARE

## 2022-06-02 VITALS
RESPIRATION RATE: 20 BRPM | OXYGEN SATURATION: 98 % | HEIGHT: 59 IN | BODY MASS INDEX: 22.13 KG/M2 | DIASTOLIC BLOOD PRESSURE: 61 MMHG | TEMPERATURE: 98.6 F | HEART RATE: 60 BPM | WEIGHT: 109.8 LBS | SYSTOLIC BLOOD PRESSURE: 126 MMHG

## 2022-06-02 PROBLEM — K62.4 ANAL STRICTURE: Status: ACTIVE | Noted: 2022-06-02

## 2022-06-02 PROCEDURE — 88305 TISSUE EXAM BY PATHOLOGIST: CPT

## 2022-06-02 PROCEDURE — 77030019988 HC FCPS ENDOSC DISP BSC -B: Performed by: INTERNAL MEDICINE

## 2022-06-02 PROCEDURE — 76060000031 HC ANESTHESIA FIRST 0.5 HR: Performed by: INTERNAL MEDICINE

## 2022-06-02 PROCEDURE — 45380 COLONOSCOPY AND BIOPSY: CPT | Performed by: INTERNAL MEDICINE

## 2022-06-02 PROCEDURE — 76040000019: Performed by: INTERNAL MEDICINE

## 2022-06-02 PROCEDURE — 2709999900 HC NON-CHARGEABLE SUPPLY: Performed by: INTERNAL MEDICINE

## 2022-06-02 PROCEDURE — 74011250636 HC RX REV CODE- 250/636: Performed by: NURSE ANESTHETIST, CERTIFIED REGISTERED

## 2022-06-02 RX ORDER — PROPOFOL 10 MG/ML
INJECTION, EMULSION INTRAVENOUS AS NEEDED
Status: DISCONTINUED | OUTPATIENT
Start: 2022-06-02 | End: 2022-06-02 | Stop reason: HOSPADM

## 2022-06-02 RX ORDER — SODIUM CHLORIDE 0.9 % (FLUSH) 0.9 %
5-40 SYRINGE (ML) INJECTION EVERY 8 HOURS
Status: DISCONTINUED | OUTPATIENT
Start: 2022-06-02 | End: 2022-06-02 | Stop reason: HOSPADM

## 2022-06-02 RX ORDER — SODIUM CHLORIDE 9 MG/ML
25 INJECTION, SOLUTION INTRAVENOUS CONTINUOUS
Status: DISCONTINUED | OUTPATIENT
Start: 2022-06-02 | End: 2022-06-02 | Stop reason: HOSPADM

## 2022-06-02 RX ORDER — SODIUM CHLORIDE 0.9 % (FLUSH) 0.9 %
5-40 SYRINGE (ML) INJECTION AS NEEDED
Status: DISCONTINUED | OUTPATIENT
Start: 2022-06-02 | End: 2022-06-02 | Stop reason: HOSPADM

## 2022-06-02 RX ORDER — SODIUM CHLORIDE 9 MG/ML
125 INJECTION, SOLUTION INTRAVENOUS CONTINUOUS
Status: DISCONTINUED | OUTPATIENT
Start: 2022-06-02 | End: 2022-06-02 | Stop reason: HOSPADM

## 2022-06-02 RX ORDER — SODIUM CHLORIDE 9 MG/ML
INJECTION, SOLUTION INTRAVENOUS
Status: DISCONTINUED | OUTPATIENT
Start: 2022-06-02 | End: 2022-06-02 | Stop reason: HOSPADM

## 2022-06-02 RX ADMIN — PROPOFOL 50 MG: 10 INJECTION, EMULSION INTRAVENOUS at 14:19

## 2022-06-02 RX ADMIN — PROPOFOL 50 MG: 10 INJECTION, EMULSION INTRAVENOUS at 14:26

## 2022-06-02 RX ADMIN — PROPOFOL 50 MG: 10 INJECTION, EMULSION INTRAVENOUS at 14:23

## 2022-06-02 RX ADMIN — SODIUM CHLORIDE: 9 INJECTION, SOLUTION INTRAVENOUS at 14:07

## 2022-06-02 NOTE — PROGRESS NOTES
OFFICE VISIT NOTE    Kee Guerrero is a 80 y.o. female who presents to the office today for:    Chief Complaint   Patient presents with    New Patient     Ref by Mere Lo        The patient is an 49-year-old female who is referred for evaluation of an anal stricture. She has a longstanding history of Crohn's disease which according to her is primarily anorectal disease she has had perianal abscesses in the past.  She does have a longstanding history of an anal stricture she states. She states that it is difficult to move her bowels and she does have urgency and defecation. She by history again has a longstanding history of anal stricture. Her last colonoscopy was done by Dr. Caroline Perkins. She was evaluated by Dr. Martina Sifuentes as her prior gastroenterologist Dr. Caroline Perkins has left the area. In terms of her Crohn's disease she is currently on Entyvio. She does have a personal history of polyps. Dr. Martina Sifuentes attempted a colonoscopy but could not pass the colonoscope secondary to her stricture. Her past medical history is also significant for history of CKD stage IV. She is followed by nephrology. She also has a history of GERD hypothyroidism, hypertension. .        Past Medical History:   Diagnosis Date    Arthritis     Chronic kidney disease     stage 4    Colon polyps 03/07/2019    rectal polyp    Constipation     Crohn disease ([address]     Early satiety     GERD (gastroesophageal reflux disease)     history of chron's disease    HTN (hypertension) 11/30/2021    Thyroid disorder 11/30/2021       Past Surgical History:   Procedure Laterality Date    COLONOSCOPY  03/07/2019    rectal polyp  Dr Caroline Perkins    COLONOSCOPY N/A 3/23/2022    COLONOSCOPY/EGD (TIVA) performed by Adali Cuevas MD at [address] [place] [address] VISIT-OUTPATIENT  03/07/2017    DR Corey Stewart  - EARLY SATIETY, WT LOSS , BACK PAIN    HX APPENDECTOMY      HX GI      gallbladder removed    HX HYSTERECTOMY         Family History   Problem Relation Age of Onset    Pancreatic Cancer Mother     Diabetes Mother     Cancer Mother     Heart Disease Father     Hypertension Sister     Diabetes Sister     Multiple myeloma Sister     Glaucoma Sister        Social History     Socioeconomic History    Marital status:      Spouse name: Not on file    Number of children: Not on file    Years of education: Not on file    Highest education level: Not on file   Occupational History    Not on file   Tobacco Use    Smoking status: Never Smoker    Smokeless tobacco: Never Used   Vaping Use    Vaping Use: Never used   Substance and Sexual Activity    Alcohol use: Never    Drug use: Never    Sexual activity: Not on file   Other Topics Concern    Not on file   Social History Narrative    Not on file     Social Determinants of Health     Financial Resource Strain:     Difficulty of Paying Living Expenses: Not on file   Food Insecurity:     Worried About [address] in the Last Year: Not on file    [email or web address] [place] in the Last Year: Not on file   Transportation Needs:     Lack of Transportation (Medical): Not on file    Lack of Transportation (Non-Medical):  Not on file   Physical Activity:     Days of Exercise per Week: Not on file    Minutes of Exercise per Session: Not on file   Stress:     Feeling of Stress : Not on file   Social Connections:     Frequency of Communication with Friends and Family: Not on file    Frequency of Social Gatherings with Friends and Family: Not on file    Attends Bahai Services: Not on file    Active Member of Clubs or Organizations: Not on file    Attends Club or Organization Meetings: Not on file    Marital Status: Not on file   Intimate Partner Violence:     Fear of Current or Ex-Partner: Not on file    Emotionally Abused: Not on file    Physically Abused: Not on file   Dayan Manual Sexually Abused: Not on file   Housing Stability:     Unable to Pay for Housing in the Last Year: Not on file    Number of Places Lived in the Last Year: Not on file    Unstable Housing in the Last Year: Not on file       No Known Allergies    Current Outpatient Medications   Medication Sig    famotidine (PEPCID) 40 mg tablet Take 1 Tablet by mouth daily.  fexofenadine (ALLEGRA) 180 mg tablet Take 1 Tablet by mouth daily.  vedolizumab (Entyvio) 300 mg injection 300 mg by IntraVENous route every six (6) weeks.  fluticasone propionate (FLONASE) 50 mcg/actuation nasal spray 2 Sprays by Both Nostrils route daily.  calcium-cholecalciferol, d3, 600 mg calcium- 200 unit cap Take 1 Capsule by mouth daily.  levothyroxine (SYNTHROID) 75 mcg tablet Take 75 mcg by mouth daily.  traMADoL (ULTRAM) 50 mg tablet 50 mg every eight (8) hours as needed.  verapamil ER (CALAN-SR) 240 mg CR tablet Take 240 mg by mouth daily.  zolpidem (AMBIEN) 10 mg tablet Take 10 mg by mouth nightly as needed.  sodium-potassium-mag sulfate (Suprep Bowel Prep Kit) 17.5-3.13-1.6 gram solr oral solution Use as instructed by physician for colonoscopy prep. Indications: emptying of the bowel    DULoxetine (CYMBALTA) 30 mg capsule 1 Capsule daily. (Patient not taking: Reported on 3/23/2022)     No current facility-administered medications for this visit. Review of Systems   Constitutional: Negative. HENT: Negative. Eyes: Negative. Respiratory: Negative. Cardiovascular: Negative. Gastrointestinal: Positive for abdominal pain and heartburn. Musculoskeletal: Positive for back pain and joint pain. Skin: Negative. Neurological: Negative. Endo/Heme/Allergies:        Hypothyroid   Psychiatric/Behavioral: Negative.         /64 (BP 1 Location: Left arm, BP Patient Position: Sitting)   Pulse 69   Temp 97.8 °F (36.6 °C) (Temporal)   Resp 18   Ht 4' 8\" (1.422 m)   Wt 110 lb 6.4 oz (50.1 kg)   SpO2 94%   BMI 24.75 kg/m²     Physical Exam  Constitutional:       General: She is not in acute distress. Appearance: Normal appearance. She is normal weight. She is not ill-appearing. HENT:      Head: Normocephalic and atraumatic. Cardiovascular:      Rate and Rhythm: Normal rate and regular rhythm. Pulmonary:      Breath sounds: Normal breath sounds. Abdominal:      General: There is no distension. Palpations: Abdomen is soft. Tenderness: There is no abdominal tenderness. Genitourinary:     Comments: Anal stricture unable to do digital rectal exam  Musculoskeletal:         General: Normal range of motion. Cervical back: Normal range of motion and neck supple. Skin:     General: Skin is warm and dry. Coloration: Skin is not jaundiced. Neurological:      General: No focal deficit present. Mental Status: She is alert. Mental status is at baseline. Psychiatric:         Mood and Affect: Mood normal.         Thought Content: Thought content normal.         Problem List Items Addressed This Visit        Other    Crohn disease ([facility] - Primary    Personal history of colonic polyps          Assessment and Plan:    I told the patient and her family were present with her that is possible a pediatric colonoscope could be passed and do the colonoscopy. Her anal stricture is longstanding and given the fact that she has anorectal Crohn's doing any sort of a surgical procedure for strictures high risk for developing fistulas and/or abscesses. I told her that if the colonoscope could not be passed I could try to do gentle dilation at the time with Hegar dilators. I did tell her that with dilation there is a possibility of fistula and/or abscess afterwards given her history of anorectal Crohn's if there is any significant mucosal injury and I would not attempt to fully dilate her.   She is amenable to this and her procedure will be scheduled with Dr. Monica Logan and myself on June 2, 2022Blaurel Aranda MD

## 2022-06-02 NOTE — ANESTHESIA POSTPROCEDURE EVALUATION
Procedure(s):  COLONOSCOPY (TIVA).     MAC    Anesthesia Post Evaluation        Patient location during evaluation: bedside  Patient participation: complete - patient participated  Level of consciousness: awake and alert  Pain score: 0  Pain management: adequate  Airway patency: patent  Anesthetic complications: no  Cardiovascular status: acceptable  Respiratory status: acceptable  Hydration status: acceptable  Post anesthesia nausea and vomiting:  none  Final Post Anesthesia Temperature Assessment:  Normothermia (36.0-37.5 degrees C)      INITIAL Post-op Vital signs:   Vitals Value Taken Time   /90 06/02/22 1436   Temp 37 °C (98.6 °F) 06/02/22 1436   Pulse 65 06/02/22 1436   Resp 22 06/02/22 1436   SpO2 100 % 06/02/22 1436

## 2022-06-02 NOTE — OP NOTES
Colonoscopy Procedure Note      Patient: Romeo Andrea MRN: [id number]  SSN: [id number]    YOB: 1940  Age: 80 y.o. Sex: female      Date of Procedure: 6/2/2022  Date/Time:  6/2/2022 2:39 PM       IMPRESSION:     1. Proctocolitis   2. Anal stenosis         RECOMMENDATIONS:     1) Check biopsy results. 2) Await pathology report. Call me in 2 weeks if you have not received any information from my office regarding your results. 3) Repeat colonoscopy in a couple years. 4) Consider addition of a hydrocortisone enema once daily. INDICATION: Crohn's disease, anal stricture     PROCEDURE PERFORMED: Colonoscopy with cold biopsies      DESCRIPTION OF PROCEDURE: An informed consent was obtained. The patient was placed in left lateral position. Perianal inspection and a digital rectal exam was performed. Video colonoscope was introduced into the rectum and advanced under direct vision up to the terminal ileum. With adequate insufflation and maneuvering of the withdrawing scope, the colonic mucosa was visualized carefully. Retroflexion was performed in the rectum to see the anorectum and also in the ascending colon to look behind the folds. Vital signs, pulse oximetry, single lead cardiac monitor were monitored throughout the procedure as the sedation was titrated to the desired effect ensuring patient comfort and safety. The patient tolerated the procedure very well and was transferred to the recovery area. Following is the summary of findings: There was inflammation and edema noted of from the distal sigmoid colon and involving the entire rectal region. Biopsies were taken in that region. Upon entering the anal canal is somewhat stenotic however as we went through it there was no intrinsic lesion just was stiff through that region.        ENDOSCOPIST: Divya Hyatt MD      ENDOSCOPE: Olympus video pediatric colonoscope     ASSISTANT:Circ-1: Amy Cantu RN Circ-2: Rayna Grewal              Scrub Tech-1: Chencho Schofield     ANESTHESIA: TIVA      QUALITY OF PREPARATION: Good      FINDINGS:   1. Proctocolitis  2.  Anal stenosis        Complications: None     EBL: Minimal     SPECIMENS:   ID Type Source Tests Collected by Time Destination   1 : rectal mucosa    Edith Ledbetter MD 6/2/2022 1428 Pathology             León Obrien MD  June 2, 2022  2:39 PM

## 2022-06-02 NOTE — DISCHARGE INSTRUCTIONS

## 2022-06-02 NOTE — INTERVAL H&P NOTE
Update History & Physical    The Patient's History and Physical of June 2, 2022,  was reviewed with the patient and I examined the patient. There was no change. The surgical site was confirmed by the patient and me. Plan:  The risk, benefits, expected outcome, and alternative to the recommended procedure have been discussed with the patient. Patient understands and wants to proceed with the procedure.     Electronically signed by Lois Sullivan MD on 6/2/2022 at 1:10 PM

## 2022-06-02 NOTE — ANESTHESIA PREPROCEDURE EVALUATION
Relevant Problems   No relevant active problems       Anesthetic History   No history of anesthetic complications            Review of Systems / Medical History  Patient summary reviewed, nursing notes reviewed and pertinent labs reviewed    Pulmonary  Within defined limits                 Neuro/Psych   Within defined limits           Cardiovascular    Hypertension                   GI/Hepatic/Renal     GERD           Endo/Other      Hypothyroidism  Arthritis     Other Findings              Physical Exam    Airway  Mallampati: I  TM Distance: 4 - 6 cm  Neck ROM: normal range of motion   Mouth opening: Normal     Cardiovascular  Regular rate and rhythm,  S1 and S2 normal,  no murmur, click, rub, or gallop             Dental    Dentition: Loose teeth and Poor dentition     Pulmonary  Breath sounds clear to auscultation               Abdominal  GI exam deferred       Other Findings            Anesthetic Plan    ASA: 2  Anesthesia type: MAC    Monitoring Plan: Continuous noninvasive hemodynamic monitoring      Induction: Intravenous  Anesthetic plan and risks discussed with: Patient and Family

## 2022-06-02 NOTE — H&P (VIEW-ONLY)
OFFICE VISIT NOTE    Herman Butler is a 80 y.o. female who presents to the office today for:    Chief Complaint   Patient presents with    New Patient     Ref by Celia Gaffney        The patient is an 44-year-old female who is referred for evaluation of an anal stricture. She has a longstanding history of Crohn's disease which according to her is primarily anorectal disease she has had perianal abscesses in the past.  She does have a longstanding history of an anal stricture she states. She states that it is difficult to move her bowels and she does have urgency and defecation. She by history again has a longstanding history of anal stricture. Her last colonoscopy was done by Dr. Jazmyne Andrade. She was evaluated by Dr. Ramy Soni as her prior gastroenterologist Dr. Jazmyne Andrade has left the area. In terms of her Crohn's disease she is currently on Entyvio. She does have a personal history of polyps. Dr. Ramy Soni attempted a colonoscopy but could not pass the colonoscope secondary to her stricture. Her past medical history is also significant for history of CKD stage IV. She is followed by nephrology. She also has a history of GERD hypothyroidism, hypertension. .        Past Medical History:   Diagnosis Date    Arthritis     Chronic kidney disease     stage 4    Colon polyps 03/07/2019    rectal polyp    Constipation     Crohn disease ([address]     Early satiety     GERD (gastroesophageal reflux disease)     history of chron's disease    HTN (hypertension) 11/30/2021    Thyroid disorder 11/30/2021       Past Surgical History:   Procedure Laterality Date    COLONOSCOPY  03/07/2019    rectal polyp  Dr Jazmyne Andrade    COLONOSCOPY N/A 3/23/2022    COLONOSCOPY/EGD (TIVA) performed by James De Leon MD at [facility]. [address] VISIT-OUTPATIENT  03/07/2017    DR Shona Duane  - EARLY SATIETY, WT LOSS , BACK Pt resting in bed. NAD, VSS, RR equal and unlabored. Will continue to monitor   PAIN    HX APPENDECTOMY      HX GI      gallbladder removed    HX HYSTERECTOMY         Family History   Problem Relation Age of Onset    Pancreatic Cancer Mother     Diabetes Mother     Cancer Mother     Heart Disease Father     Hypertension Sister     Diabetes Sister     Multiple myeloma Sister     Glaucoma Sister        Social History     Socioeconomic History    Marital status:      Spouse name: Not on file    Number of children: Not on file    Years of education: Not on file    Highest education level: Not on file   Occupational History    Not on file   Tobacco Use    Smoking status: Never Smoker    Smokeless tobacco: Never Used   Vaping Use    Vaping Use: Never used   Substance and Sexual Activity    Alcohol use: Never    Drug use: Never    Sexual activity: Not on file   Other Topics Concern    Not on file   Social History Narrative    Not on file     Social Determinants of Health     Financial Resource Strain:     Difficulty of Paying Living Expenses: Not on file   Food Insecurity:     Worried About [address] in the Last Year: Not on file    [email or web address] in the Last Year: Not on file   Transportation Needs:     Lack of Transportation (Medical): Not on file    Lack of Transportation (Non-Medical):  Not on file   Physical Activity:     Days of Exercise per Week: Not on file    Minutes of Exercise per Session: Not on file   Stress:     Feeling of Stress : Not on file   Social Connections:     Frequency of Communication with Friends and Family: Not on file    Frequency of Social Gatherings with Friends and Family: Not on file    Attends Mormon Services: Not on file    Active Member of Clubs or Organizations: Not on file    Attends Club or Organization Meetings: Not on file    Marital Status: Not on file   Intimate Partner Violence:     Fear of Current or Ex-Partner: Not on file    Emotionally Abused: Not on file    Physically Abused: Not on file   Lizbeth Owens Sexually Abused: Not on file   Housing Stability:     Unable to Pay for Housing in the Last Year: Not on file    Number of Places Lived in the Last Year: Not on file    Unstable Housing in the Last Year: Not on file       No Known Allergies    Current Outpatient Medications   Medication Sig    famotidine (PEPCID) 40 mg tablet Take 1 Tablet by mouth daily.  fexofenadine (ALLEGRA) 180 mg tablet Take 1 Tablet by mouth daily.  vedolizumab (Entyvio) 300 mg injection 300 mg by IntraVENous route every six (6) weeks.  fluticasone propionate (FLONASE) 50 mcg/actuation nasal spray 2 Sprays by Both Nostrils route daily.  calcium-cholecalciferol, d3, 600 mg calcium- 200 unit cap Take 1 Capsule by mouth daily.  levothyroxine (SYNTHROID) 75 mcg tablet Take 75 mcg by mouth daily.  traMADoL (ULTRAM) 50 mg tablet 50 mg every eight (8) hours as needed.  verapamil ER (CALAN-SR) 240 mg CR tablet Take 240 mg by mouth daily.  zolpidem (AMBIEN) 10 mg tablet Take 10 mg by mouth nightly as needed.  sodium-potassium-mag sulfate (Suprep Bowel Prep Kit) 17.5-3.13-1.6 gram solr oral solution Use as instructed by physician for colonoscopy prep. Indications: emptying of the bowel    DULoxetine (CYMBALTA) 30 mg capsule 1 Capsule daily. (Patient not taking: Reported on 3/23/2022)     No current facility-administered medications for this visit. Review of Systems   Constitutional: Negative. HENT: Negative. Eyes: Negative. Respiratory: Negative. Cardiovascular: Negative. Gastrointestinal: Positive for abdominal pain and heartburn. Musculoskeletal: Positive for back pain and joint pain. Skin: Negative. Neurological: Negative. Endo/Heme/Allergies:        Hypothyroid   Psychiatric/Behavioral: Negative.         /64 (BP 1 Location: Left arm, BP Patient Position: Sitting)   Pulse 69   Temp 97.8 °F (36.6 °C) (Temporal)   Resp 18   Ht 4' 8\" (1.422 m)   Wt 110 lb 6.4 oz (50.1 kg)   SpO2 94%   BMI 24.75 kg/m²     Physical Exam  Constitutional:       General: She is not in acute distress. Appearance: Normal appearance. She is normal weight. She is not ill-appearing. HENT:      Head: Normocephalic and atraumatic. Cardiovascular:      Rate and Rhythm: Normal rate and regular rhythm. Pulmonary:      Breath sounds: Normal breath sounds. Abdominal:      General: There is no distension. Palpations: Abdomen is soft. Tenderness: There is no abdominal tenderness. Genitourinary:     Comments: Anal stricture unable to do digital rectal exam  Musculoskeletal:         General: Normal range of motion. Cervical back: Normal range of motion and neck supple. Skin:     General: Skin is warm and dry. Coloration: Skin is not jaundiced. Neurological:      General: No focal deficit present. Mental Status: She is alert. Mental status is at baseline. Psychiatric:         Mood and Affect: Mood normal.         Thought Content: Thought content normal.         Problem List Items Addressed This Visit        Other    Crohn disease ([facility] [address] - Primary    Personal history of colonic polyps          Assessment and Plan:    I told the patient and her family were present with her that is possible a pediatric colonoscope could be passed and do the colonoscopy. Her anal stricture is longstanding and given the fact that she has anorectal Crohn's doing any sort of a surgical procedure for strictures high risk for developing fistulas and/or abscesses. I told her that if the colonoscope could not be passed I could try to do gentle dilation at the time with Hegar dilators. I did tell her that with dilation there is a possibility of fistula and/or abscess afterwards given her history of anorectal Crohn's if there is any significant mucosal injury and I would not attempt to fully dilate her.   She is amenable to this and her procedure will be scheduled with Dr. Shailesh Nogueira and myself on June 2, 2022Juan M Mott MD

## 2022-06-08 ENCOUNTER — TELEPHONE (OUTPATIENT)
Dept: GASTROENTEROLOGY | Age: 82
End: 2022-06-08

## 2022-06-08 NOTE — TELEPHONE ENCOUNTER
I called and spoken with Evangelist Olivas, explained that her mom's test showed inflammation like IBS , lower colon- rectum, no other abnormal cells noted.  Follow up scheduled  6- at 4pm.

## 2022-06-08 NOTE — TELEPHONE ENCOUNTER
----- Message from Reji Solis MD sent at 6/8/2022 12:17 PM EDT -----  Tell patient that the biopsies taken in the rectum showed chronic inflammation in the rectum consistent with her inflammatory bowel disease. No other abnormal cells were noted. Give patient a follow-up visit.

## 2022-06-08 NOTE — PROGRESS NOTES
Tell patient that the biopsies taken in the rectum showed chronic inflammation in the rectum consistent with her inflammatory bowel disease. No other abnormal cells were noted. Give patient a follow-up visit.

## 2022-06-20 DIAGNOSIS — K21.00 GASTROESOPHAGEAL REFLUX DISEASE WITH ESOPHAGITIS WITHOUT HEMORRHAGE: ICD-10-CM

## 2022-06-23 ENCOUNTER — OFFICE VISIT (OUTPATIENT)
Dept: GASTROENTEROLOGY | Age: 82
End: 2022-06-23
Payer: MEDICARE

## 2022-06-23 VITALS
DIASTOLIC BLOOD PRESSURE: 60 MMHG | OXYGEN SATURATION: 98 % | SYSTOLIC BLOOD PRESSURE: 120 MMHG | WEIGHT: 111.4 LBS | RESPIRATION RATE: 12 BRPM | BODY MASS INDEX: 22.46 KG/M2 | TEMPERATURE: 98.1 F | HEART RATE: 83 BPM | HEIGHT: 59 IN

## 2022-06-23 DIAGNOSIS — N18.4 STAGE 4 CHRONIC KIDNEY DISEASE (HCC): ICD-10-CM

## 2022-06-23 DIAGNOSIS — R13.19 ESOPHAGEAL DYSPHAGIA: ICD-10-CM

## 2022-06-23 DIAGNOSIS — K62.4 ANAL STRICTURE: ICD-10-CM

## 2022-06-23 DIAGNOSIS — K52.9 PROCTOCOLITIS: ICD-10-CM

## 2022-06-23 DIAGNOSIS — K50.119 CROHN'S DISEASE OF LARGE INTESTINE WITH COMPLICATION (HCC): Primary | ICD-10-CM

## 2022-06-23 DIAGNOSIS — Z86.010 PERSONAL HISTORY OF COLONIC POLYPS: ICD-10-CM

## 2022-06-23 PROCEDURE — 99214 OFFICE O/P EST MOD 30 MIN: CPT | Performed by: INTERNAL MEDICINE

## 2022-06-23 PROCEDURE — G8754 DIAS BP LESS 90: HCPCS | Performed by: INTERNAL MEDICINE

## 2022-06-23 PROCEDURE — G8427 DOCREV CUR MEDS BY ELIG CLIN: HCPCS | Performed by: INTERNAL MEDICINE

## 2022-06-23 PROCEDURE — G8752 SYS BP LESS 140: HCPCS | Performed by: INTERNAL MEDICINE

## 2022-06-23 PROCEDURE — G8400 PT W/DXA NO RESULTS DOC: HCPCS | Performed by: INTERNAL MEDICINE

## 2022-06-23 PROCEDURE — G8510 SCR DEP NEG, NO PLAN REQD: HCPCS | Performed by: INTERNAL MEDICINE

## 2022-06-23 PROCEDURE — 1090F PRES/ABSN URINE INCON ASSESS: CPT | Performed by: INTERNAL MEDICINE

## 2022-06-23 PROCEDURE — G8536 NO DOC ELDER MAL SCRN: HCPCS | Performed by: INTERNAL MEDICINE

## 2022-06-23 PROCEDURE — 1123F ACP DISCUSS/DSCN MKR DOCD: CPT | Performed by: INTERNAL MEDICINE

## 2022-06-23 PROCEDURE — G8420 CALC BMI NORM PARAMETERS: HCPCS | Performed by: INTERNAL MEDICINE

## 2022-06-23 PROCEDURE — 1101F PT FALLS ASSESS-DOCD LE1/YR: CPT | Performed by: INTERNAL MEDICINE

## 2022-06-23 NOTE — PROGRESS NOTES
Usha Beaver is a 80 y.o. female who presents today for the following:  Chief Complaint   Patient presents with    Follow-up     colonoscopy 6-2-2022         No Known Allergies    Current Outpatient Medications   Medication Sig    famotidine (PEPCID) 40 mg tablet Take 1 Tablet by mouth daily.  fexofenadine (ALLEGRA) 180 mg tablet Take 1 Tablet by mouth daily.  fluticasone propionate (FLONASE) 50 mcg/actuation nasal spray 2 Sprays by Both Nostrils route daily.  calcium-cholecalciferol, d3, 600 mg calcium- 200 unit cap Take 1 Capsule by mouth daily.  levothyroxine (SYNTHROID) 75 mcg tablet Take 75 mcg by mouth daily.  traMADoL (ULTRAM) 50 mg tablet 50 mg every eight (8) hours as needed.  verapamil ER (CALAN-SR) 240 mg CR tablet Take 240 mg by mouth daily.  zolpidem (AMBIEN) 10 mg tablet Take 10 mg by mouth nightly as needed.  DULoxetine (CYMBALTA) 30 mg capsule 1 Capsule daily. (Patient not taking: Reported on 3/23/2022)    vedolizumab (Entyvio) 300 mg injection 300 mg by IntraVENous route every six (6) weeks. No current facility-administered medications for this visit.        Past Medical History:   Diagnosis Date    Arthritis     Chronic kidney disease     stage 4    Colon polyps 03/07/2019    rectal polyp    Constipation     Crohn disease ([address]     Early satiety     GERD (gastroesophageal reflux disease)     history of chron's disease    HTN (hypertension) 11/30/2021    Thyroid disorder 11/30/2021       Past Surgical History:   Procedure Laterality Date    COLONOSCOPY  03/07/2019    rectal polyp  Dr Phylicia Wright    COLONOSCOPY N/A 3/23/2022    COLONOSCOPY/EGD (TIVA) performed by Felicia Hernandez MD at [facility] ENDOSCOPY    COLONOSCOPY N/A 6/2/2022    COLONOSCOPY (TIVA) performed by Felicia Hernandez MD at [address] [place] [address] VISIT-OUTPATIENT  03/07/2017    DR GUERRERO  - EARLY SATIETY, WT LOSS , BACK PAIN    HX APPENDECTOMY      HX GI      gallbladder removed    HX HYSTERECTOMY         Family History   Problem Relation Age of Onset    Pancreatic Cancer Mother     Diabetes Mother     Cancer Mother     Heart Disease Father     Hypertension Sister     Diabetes Sister     Multiple myeloma Sister     Glaucoma Sister        Social History     Socioeconomic History    Marital status:      Spouse name: Not on file    Number of children: Not on file    Years of education: Not on file    Highest education level: Not on file   Occupational History    Not on file   Tobacco Use    Smoking status: Never Smoker    Smokeless tobacco: Never Used   Vaping Use    Vaping Use: Never used   Substance and Sexual Activity    Alcohol use: Never    Drug use: Never    Sexual activity: Not on file   Other Topics Concern    Not on file   Social History Narrative    Not on file     Social Determinants of Health     Financial Resource Strain:     Difficulty of Paying Living Expenses: Not on file   Food Insecurity:     Worried About [address] in the Last Year: Not on file    [address] [employer] Audit in the Last Year: Not on file   Transportation Needs:     Lack of Transportation (Medical): Not on file    Lack of Transportation (Non-Medical):  Not on file   Physical Activity:     Days of Exercise per Week: Not on file    Minutes of Exercise per Session: Not on file   Stress:     Feeling of Stress : Not on file   Social Connections:     Frequency of Communication with Friends and Family: Not on file    Frequency of Social Gatherings with Friends and Family: Not on file    Attends Hoahaoism Services: Not on file    Active Member of Clubs or Organizations: Not on file    Attends Club or Organization Meetings: Not on file    Marital Status: Not on file   Intimate Partner Violence:     Fear of Current or Ex-Partner: Not on file    Emotionally Abused: Not on file    Physically Abused: Not on file    Sexually Abused: Not on file   Housing Stability:     Unable to Pay for Housing in the Last Year: Not on file    Number of Places Lived in the Last Year: Not on file    Unstable Housing in the Last Year: Not on file         HPI  59-year-old female with history of hypertension, chronic kidney disease stage III, gastroesophageal reflux disease, and Crohn's disease on biologic agent who comes in for follow-up visit. The patient had a colonoscopy on 6/2/2022 which showed proctocolitis and anal stenosis. Patient had had an EGD on 3/23/2022 which showed generalized gastritis with erosions, atrophic gastric mucosa, distal esophagitis (grade 2), a dilated distal esophagus, and decreased esophageal tone. Patient states she is about the same. She does take the famotidine 40 mg most days. She has some problem for swallowing still. Especially the Allegra tablet. Her bowel movements are usually daily. Amounts from a small amount to 4 bowel movement. She does have a burning sensation at times in the lower abdomen especially in the mornings    Review of Systems   Constitutional: Negative. HENT: Negative. Negative for nosebleeds. Eyes: Negative. Respiratory: Negative. Cardiovascular: Negative. Gastrointestinal: Positive for abdominal pain. Negative for blood in stool, constipation, diarrhea, heartburn, melena, nausea and vomiting. Genitourinary: Negative. Musculoskeletal: Positive for back pain and joint pain. Skin: Negative. Neurological: Negative. Endo/Heme/Allergies: Negative. Psychiatric/Behavioral: Negative. All other systems reviewed and are negative. Visit Vitals  /60 (BP 1 Location: Left upper arm, BP Patient Position: Sitting, BP Cuff Size: Adult)   Pulse 83   Temp 98.1 °F (36.7 °C) (Temporal)   Resp 12   Ht 4' 11\" (1.499 m)   Wt 50.5 kg (111 lb 6.4 oz)   SpO2 98% Comment: room air   BMI 22.50 kg/m²     Physical Exam  Vitals and nursing note reviewed. Constitutional:       Appearance: Normal appearance. She is normal weight. HENT:      Head: Normocephalic and atraumatic. Nose: Nose normal.      Mouth/Throat:      Mouth: Mucous membranes are moist.      Pharynx: Oropharynx is clear. Eyes:      General: No scleral icterus. Conjunctiva/sclera: Conjunctivae normal.      Pupils: Pupils are equal, round, and reactive to light. Cardiovascular:      Rate and Rhythm: Normal rate and regular rhythm. Pulses: Normal pulses. Heart sounds: Normal heart sounds. Pulmonary:      Effort: Pulmonary effort is normal.      Breath sounds: Normal breath sounds. Abdominal:      General: Bowel sounds are normal. There is no distension. Palpations: Abdomen is soft. There is no mass. Tenderness: There is abdominal tenderness. There is no right CVA tenderness, left CVA tenderness, guarding or rebound. Hernia: A hernia (umbilical) is present. Musculoskeletal:         General: Normal range of motion. Cervical back: Normal range of motion and neck supple. Skin:     General: Skin is warm and dry. Coloration: Skin is not jaundiced. Neurological:      General: No focal deficit present. Mental Status: She is alert and oriented to person, place, and time. Psychiatric:         Mood and Affect: Mood normal.         Behavior: Behavior normal.         Thought Content: Thought content normal.         Judgment: Judgment normal.            1. Crohn's disease of large intestine with complication ([address]  Appears to be stable with use of the Entyvio. No evidence of abscess formation or significant inflammation was noted on the last exam.    2. Personal history of colonic polyps      3. Anal stricture  Secondary to multiple previous surgeries to the area after abscess formation. 4. Stage 4 chronic kidney disease ([address]  Patient states that her last visit to her nephrologist states that she was now stage III    5.  Esophageal dysphagia  Patient advised that the people that she has problems with does come in liquid form and that may be better for her. She is to eat slowly  6.  Proctocolitis

## 2022-06-23 NOTE — PROGRESS NOTES
1. Have you been to the ER, urgent care clinic since your last visit? Hospitalized since your last visit?no    2. Have you seen or consulted any other health care providers outside of the [address] [place] [address] since your last visit? Include any pap smears or colon screening.  No   Chief Complaint   Patient presents with    Follow-up     Visit Vitals  /60 (BP 1 Location: Left upper arm, BP Patient Position: Sitting, BP Cuff Size: Adult)   Pulse 83   Temp 98.1 °F (36.7 °C) (Temporal)   Resp 12   Ht 4' 11\" (1.499 m)   Wt 50.5 kg (111 lb 6.4 oz)   SpO2 98% Comment: room air   BMI 22.50 kg/m²

## 2022-06-27 RX ORDER — FAMOTIDINE 40 MG/1
TABLET, FILM COATED ORAL
Qty: 90 TABLET | Refills: 1 | Status: SHIPPED | OUTPATIENT
Start: 2022-06-27

## 2023-01-06 DIAGNOSIS — K21.00 GASTROESOPHAGEAL REFLUX DISEASE WITH ESOPHAGITIS WITHOUT HEMORRHAGE: ICD-10-CM

## 2023-01-10 RX ORDER — FAMOTIDINE 40 MG/1
TABLET, FILM COATED ORAL
Qty: 90 TABLET | Refills: 3 | Status: SHIPPED | OUTPATIENT
Start: 2023-01-10

## 2024-01-25 DIAGNOSIS — K21.00 GASTRO-ESOPHAGEAL REFLUX DISEASE WITH ESOPHAGITIS, WITHOUT BLEEDING: ICD-10-CM

## 2024-01-26 RX ORDER — FAMOTIDINE 40 MG/1
TABLET, FILM COATED ORAL
Qty: 90 TABLET | Refills: 3 | Status: SHIPPED | OUTPATIENT
Start: 2024-01-26

## (undated) DEVICE — FCPS RAD JAW 4LC 240CM W/NDL -- BX/20 RADIAL JAW 4

## (undated) DEVICE — FCPS RAD JAW 4 SC 240CM W/NDL --

## (undated) DEVICE — SOLUTION IRRIG 1000ML STRL H2O USP PLAS POUR BTL

## (undated) DEVICE — GLOVE ORANGE PI 7 1/2   MSG9075

## (undated) DEVICE — 1200CC GUARDIAN II: Brand: GUARDIAN

## (undated) DEVICE — SPONGE GZ W4XL4IN COT 12 PLY TYP VII WVN C FLD DSGN

## (undated) DEVICE — PAD,PREPPING,CUFFED,24X48,7",NONSTERILE: Brand: MEDLINE

## (undated) DEVICE — JELLY,LUBE,STERILE,FLIP TOP,TUBE,4-OZ: Brand: MEDLINE

## (undated) DEVICE — WASH CLOTH INCONT LO LINT PREM 12X13 IN LF DISP

## (undated) DEVICE — TUBING, SUCTION, 9/32" X 10', STRAIGHT: Brand: MEDLINE